# Patient Record
Sex: MALE
[De-identification: names, ages, dates, MRNs, and addresses within clinical notes are randomized per-mention and may not be internally consistent; named-entity substitution may affect disease eponyms.]

---

## 2019-04-18 ENCOUNTER — RX ONLY (OUTPATIENT)
Age: 24
Setting detail: RX ONLY
End: 2019-04-18

## 2019-04-18 ENCOUNTER — APPOINTMENT (RX ONLY)
Dept: URBAN - METROPOLITAN AREA CLINIC 52 | Facility: CLINIC | Age: 24
Setting detail: DERMATOLOGY
End: 2019-04-18

## 2019-04-18 DIAGNOSIS — L70.8 OTHER ACNE: ICD-10-CM | Status: STABLE

## 2019-04-18 PROBLEM — L70.0 ACNE VULGARIS: Status: ACTIVE | Noted: 2019-04-18

## 2019-04-18 PROCEDURE — ? COUNSELING

## 2019-04-18 PROCEDURE — ? PRESCRIPTION

## 2019-04-18 PROCEDURE — ? COSMETIC CONSULTATION: ACNE SCARRING

## 2019-04-18 PROCEDURE — ? TREATMENT REGIMEN

## 2019-04-18 RX ORDER — AZELAIC ACID 0.15 G/G
GEL TOPICAL
Qty: 1 | Refills: 9 | Status: ERX | COMMUNITY
Start: 2019-04-18

## 2019-04-18 RX ORDER — CLINDAMYCIN PHOSPHATE AND BENZOYL PEROXIDE 10; 37.5 MG/G; MG/G
GEL TOPICAL
Qty: 1 | Refills: 3 | Status: ERX | COMMUNITY
Start: 2019-04-18

## 2019-04-18 RX ORDER — AZELAIC ACID 0.15 G/G
GEL TOPICAL
Qty: 1 | Refills: 9 | Status: ERX

## 2019-04-18 RX ORDER — CLINDAMYCIN PHOSPHATE AND BENZOYL PEROXIDE 10; 37.5 MG/G; MG/G
GEL TOPICAL
Qty: 1 | Refills: 3 | Status: ERX

## 2019-04-18 RX ADMIN — CLINDAMYCIN PHOSPHATE AND BENZOYL PEROXIDE: 10; 37.5 GEL TOPICAL at 14:02

## 2019-04-18 RX ADMIN — AZELAIC ACID: 0.15 GEL TOPICAL at 14:01

## 2019-04-18 ASSESSMENT — LOCATION DETAILED DESCRIPTION DERM: LOCATION DETAILED: RIGHT INFERIOR CENTRAL MALAR CHEEK

## 2019-04-18 ASSESSMENT — LOCATION SIMPLE DESCRIPTION DERM: LOCATION SIMPLE: RIGHT CHEEK

## 2019-04-18 ASSESSMENT — LOCATION ZONE DERM: LOCATION ZONE: FACE

## 2019-04-18 NOTE — PROCEDURE: TREATMENT REGIMEN
Detail Level: Zone
Continue Regimen: Birth Control QD \\nFinacea daily in the AM \\n[place] in the PM